# Patient Record
Sex: FEMALE | Race: WHITE
[De-identification: names, ages, dates, MRNs, and addresses within clinical notes are randomized per-mention and may not be internally consistent; named-entity substitution may affect disease eponyms.]

---

## 2017-06-21 PROBLEM — Z00.00 ENCOUNTER FOR PREVENTIVE HEALTH EXAMINATION: Status: ACTIVE | Noted: 2017-06-21

## 2018-07-05 ENCOUNTER — APPOINTMENT (OUTPATIENT)
Dept: ORTHOPEDIC SURGERY | Facility: CLINIC | Age: 78
End: 2018-07-05
Payer: MEDICARE

## 2018-07-05 VITALS — WEIGHT: 175 LBS | BODY MASS INDEX: 27.47 KG/M2 | HEIGHT: 67 IN

## 2018-07-05 DIAGNOSIS — Z78.9 OTHER SPECIFIED HEALTH STATUS: ICD-10-CM

## 2018-07-05 DIAGNOSIS — M18.11 UNILATERAL PRIMARY OSTEOARTHRITIS OF FIRST CARPOMETACARPAL JOINT, RIGHT HAND: ICD-10-CM

## 2018-07-05 DIAGNOSIS — Z87.891 PERSONAL HISTORY OF NICOTINE DEPENDENCE: ICD-10-CM

## 2018-07-05 DIAGNOSIS — Z86.39 PERSONAL HISTORY OF OTHER ENDOCRINE, NUTRITIONAL AND METABOLIC DISEASE: ICD-10-CM

## 2018-07-05 DIAGNOSIS — M19.041 PRIMARY OSTEOARTHRITIS, RIGHT HAND: ICD-10-CM

## 2018-07-05 PROCEDURE — 73110 X-RAY EXAM OF WRIST: CPT | Mod: RT

## 2018-07-05 PROCEDURE — 99203 OFFICE O/P NEW LOW 30 MIN: CPT

## 2018-07-05 RX ORDER — SIMVASTATIN 20 MG/1
20 TABLET, FILM COATED ORAL
Qty: 90 | Refills: 0 | Status: ACTIVE | COMMUNITY
Start: 2018-06-18

## 2018-07-05 RX ORDER — ASPIRIN 325 MG/1
TABLET, FILM COATED ORAL
Refills: 0 | Status: ACTIVE | COMMUNITY

## 2018-07-05 RX ORDER — SOLIFENACIN SUCCINATE 5 MG/1
5 TABLET, FILM COATED ORAL
Qty: 90 | Refills: 0 | Status: ACTIVE | COMMUNITY
Start: 2018-03-22

## 2018-07-05 RX ORDER — ESTRADIOL 0.5 MG/1
0.5 TABLET ORAL
Qty: 45 | Refills: 0 | Status: ACTIVE | COMMUNITY
Start: 2018-07-01

## 2018-07-05 RX ORDER — OSELTAMIVIR PHOSPHATE 75 MG/1
75 CAPSULE ORAL
Qty: 10 | Refills: 0 | Status: ACTIVE | COMMUNITY
Start: 2018-03-01

## 2020-06-17 DIAGNOSIS — Z01.818 ENCOUNTER FOR OTHER PREPROCEDURAL EXAMINATION: ICD-10-CM

## 2020-06-20 ENCOUNTER — APPOINTMENT (OUTPATIENT)
Dept: DISASTER EMERGENCY | Facility: CLINIC | Age: 80
End: 2020-06-20

## 2020-06-22 LAB — SARS-COV-2 N GENE NPH QL NAA+PROBE: NOT DETECTED

## 2022-06-10 ENCOUNTER — APPOINTMENT (OUTPATIENT)
Dept: ORTHOPEDIC SURGERY | Facility: CLINIC | Age: 82
End: 2022-06-10
Payer: MEDICARE

## 2022-06-10 PROCEDURE — 99213 OFFICE O/P EST LOW 20 MIN: CPT | Mod: 25

## 2022-06-10 PROCEDURE — 20610 DRAIN/INJ JOINT/BURSA W/O US: CPT | Mod: 50

## 2022-06-10 PROCEDURE — J3490M: CUSTOM

## 2022-06-10 NOTE — PHYSICAL EXAM
[Normal Mood and Affect] : normal mood and affect [Orientated] : orientated [Able to Communicate] : able to communicate [Well Developed] : well developed [Well Nourished] : well nourished [Right] : right knee [Left] : left knee [NL (0)] : extension 0 degrees [5___] : hamstring 5[unfilled]/5 [Negative] : negative anterior draw [] : non-antalgic [TWNoteComboBox7] : flexion 135 degrees

## 2022-06-10 NOTE — PROCEDURE
[Large Joint Injection] : Large joint injection [Bilateral] : bilaterally of the [Knee] : knee [Pain] : pain [Inflammation] : inflammation [X-ray evidence of Osteoarthritis on this or prior visit] : x-ray evidence of Osteoarthritis on this or prior visit [Betadine] : betadine [Ethyl Chloride sprayed topically] : ethyl chloride sprayed topically [___ cc    1%] : Lidocaine ~Vcc of 1%  [___ cc    40mg] : Methylprednisolone (Depomedrol) ~Vcc of 40 mg  [] : Patient tolerated procedure well [Call if redness, pain or fever occur] : call if redness, pain or fever occur [Apply ice for 15min out of every hour for the next 12-24 hours as tolerated] : apply ice for 15 minutes out of every hour for the next 12-24 hours as tolerated [Risks, benefits, alternatives discussed / Verbal consent obtained] : the risks benefits, and alternatives have been discussed, and verbal consent was obtained

## 2022-06-10 NOTE — HISTORY OF PRESENT ILLNESS
[Left Leg] : left leg [Right Leg] : right leg [Gradual] : gradual [Sudden] : sudden [6] : 6 [5] : 5 [Burning] : burning [Shooting] : shooting [Stabbing] : stabbing [Intermittent] : intermittent [Exercising] : exercising [de-identified] : 06/10/22:  Patient is here today with complaint of recurrent bilateral knee pain 5+ months after cortisone injections to both knees. Had no relief after Euflexxa injections. Would like to repeat the cortisone injections.\par \par 1/4/22 Returns today doing better after attending PT 2x/week. ROM rt knee has improved.\par Finished Euflexxa injections x 4 weeks ago. Leaving for [place] and would like to do cortisone injections both knees.\par \par 12/6/21 Here for Euflexxa injections #3 both knees.\par \par 11/29/21: Returns today for Euflexxa injections #2 both knees. No improvement as of yet. She says she has some pain behind her right knee. \par \par 11/22/21: Returns today with continued right greater than left pain. States her left knee hurts more because of compensating for the right side. Limps. Had been in [place] and was unable to go to PT there. Cortisone injection in September gave only short term relief. \par \par 09/24/21: Returns today with complaint of recurrent bilateral knee pain x last 2-3 weeks, three months after bilateral cortisone injections. Worse walking and bending rt knee.\par \par 06/25/21: Returns today for recurrent left knee pain and known osteoarthritis and MMT and LMT. Previous cortisone injection in November of last year. Also complaining of rt knee pain after injuring rt knee getting out of a moving car x 7 weeks ago. has had daily pain since. Stair climbing is painful. Aleve two tabs as needed with some relief. No prior hx of rt knee pain.\par \par 11/17/20: Patient returns today after a Doppler study for her left veins and was told of a popliteal cyst and to followup with an ortho. No DVT. She has some posterior pain with bending. No real left knee pain today, low pain scale.\par \par 6/25/20 return visit for this 81 yo female walking 5 miles/day, c/o recurring lt knee pain x last 3 weeks duration. No limp. has pain at rest as well. Occ. wake up pain at night. Takes aleve prn. tried Feldene in the past w/o relief.\par \par 12/27/19: Returns today with left knee recurrent pain. Had cortisone injection in September which helped until this past week. Leaving to [place] for the winter and is requesting another cortisone injection for the left knee\par \par 9/3/19 returns today c/o recurring medial joint line pain lt knee.Was going to PT which helped but and to stop x 4 weeks ago after Mohs surgery on lt shin. Will restart PT tomorrow.\par \par 07/22/19: Returns today for left knee MRI results. Has been feeling 90% better after cortisone injection along the pes tendons.Still has her arthritic type pain. In PT 2x/week which helps.\par \par 7/8/19: Returns today for L knee pain. Last cortisone injection x 6 weeks ago did not help much. Feels the pain is different now - pain is still medial and now anterior and can radiate.Occ. limping. Never had an MRI.\par \par 6/6/19 Returns today feeling 50% better after cortisone injection x 9 days ago. Still c/o pain behind lt knee.Occ. limp. About 2 days ago, had an acute flareup w/ pain along the medial side of the knee.Today feeling better again.\par 05/28/19: New injury today. While getting to the passenger side of a car that was a tight area about a month ago she developed left knee pain. Taken Aleve for pain, which helped only a little. Using a cane.\par \par Has had previous left knee pain treated with cortisone injection. [] : Post Surgical Visit: no [FreeTextEntry1] : B/L knees  [de-identified] : xrays  [de-identified] : None  [de-identified] : 1/4/22

## 2022-09-26 ENCOUNTER — APPOINTMENT (OUTPATIENT)
Dept: ORTHOPEDIC SURGERY | Facility: CLINIC | Age: 82
End: 2022-09-26

## 2022-09-26 PROCEDURE — 20610 DRAIN/INJ JOINT/BURSA W/O US: CPT | Mod: RT

## 2022-09-26 PROCEDURE — 99213 OFFICE O/P EST LOW 20 MIN: CPT | Mod: 25

## 2022-09-26 NOTE — HISTORY OF PRESENT ILLNESS
[Left Leg] : left leg [Right Leg] : right leg [Gradual] : gradual [Sudden] : sudden [6] : 6 [5] : 5 [Burning] : burning [Shooting] : shooting [Stabbing] : stabbing [Intermittent] : intermittent [Exercising] : exercising [de-identified] : 9/26/22: Patient returns today with recurrent bilateral knee pain. She did have good relief with CSI at the last visit, but symptoms have returned. Occasional Aleve with relief. She is requesting repeat CSI, leaving for [place].\par \par 06/10/22:  Patient is here today with complaint of recurrent bilateral knee pain 5+ months after cortisone injections to both knees. Had no relief after Euflexxa injections. Would like to repeat the cortisone injections.\par \par 1/4/22 Returns today doing better after attending PT 2x/week. ROM rt knee has improved.\par Finished Euflexxa injections x 4 weeks ago. Leaving for [place] and would like to do cortisone injections both knees.\par \par 12/6/21 Here for Euflexxa injections #3 both knees.\par \par 11/29/21: Returns today for Euflexxa injections #2 both knees. No improvement as of yet. She says she has some pain behind her right knee. \par \par 11/22/21: Returns today with continued right greater than left pain. States her left knee hurts more because of compensating for the right side. Limps. Had been in [place] and was unable to go to PT there. Cortisone injection in September gave only short term relief. \par \par 09/24/21: Returns today with complaint of recurrent bilateral knee pain x last 2-3 weeks, three months after bilateral cortisone injections. Worse walking and bending rt knee.\par \par 06/25/21: Returns today for recurrent left knee pain and known osteoarthritis and MMT and LMT. Previous cortisone injection in November of last year. Also complaining of rt knee pain after injuring rt knee getting out of a moving car x 7 weeks ago. has had daily pain since. Stair climbing is painful. Aleve two tabs as needed with some relief. No prior hx of rt knee pain.\par \par 11/17/20: Patient returns today after a Doppler study for her left veins and was told of a popliteal cyst and to followup with an ortho. No DVT. She has some posterior pain with bending. No real left knee pain today, low pain scale.\par \par 6/25/20 return visit for this 81 yo female walking 5 miles/day, c/o recurring lt knee pain x last 3 weeks duration. No limp. has pain at rest as well. Occ. wake up pain at night. Takes aleve prn. tried Feldene in the past w/o relief.\par \par 12/27/19: Returns today with left knee recurrent pain. Had cortisone injection in September which helped until this past week. Leaving to [place] for the winter and is requesting another cortisone injection for the left knee\par \par 9/3/19 returns today c/o recurring medial joint line pain lt knee.Was going to PT which helped but and to stop x 4 weeks ago after Mohs surgery on lt shin. Will restart PT tomorrow.\par \par 07/22/19: Returns today for left knee MRI results. Has been feeling 90% better after cortisone injection along the pes tendons.Still has her arthritic type pain. In PT 2x/week which helps.\par \par 7/8/19: Returns today for L knee pain. Last cortisone injection x 6 weeks ago did not help much. Feels the pain is different now - pain is still medial and now anterior and can radiate.Occ. limping. Never had an MRI.\par \par 6/6/19 Returns today feeling 50% better after cortisone injection x 9 days ago. Still c/o pain behind lt knee.Occ. limp. About 2 days ago, had an acute flareup w/ pain along the medial side of the knee.Today feeling better again.\par 05/28/19: New injury today. While getting to the passenger side of a car that was a tight area about a month ago she developed left knee pain. Taken Aleve for pain, which helped only a little. Using a cane.\par \par Has had previous left knee pain treated with cortisone injection. [] : Post Surgical Visit: no [FreeTextEntry1] : B/L knees  [de-identified] : xrays  [de-identified] : None  [de-identified] : 1/4/22

## 2023-01-03 ENCOUNTER — APPOINTMENT (OUTPATIENT)
Dept: ORTHOPEDIC SURGERY | Facility: CLINIC | Age: 83
End: 2023-01-03
Payer: MEDICARE

## 2023-01-03 VITALS — HEIGHT: 67 IN | BODY MASS INDEX: 27.47 KG/M2 | WEIGHT: 175 LBS

## 2023-01-03 PROCEDURE — 20610 DRAIN/INJ JOINT/BURSA W/O US: CPT | Mod: 50

## 2023-01-03 PROCEDURE — 99213 OFFICE O/P EST LOW 20 MIN: CPT | Mod: 25

## 2023-01-03 NOTE — HISTORY OF PRESENT ILLNESS
[0] : 0 [Dull/Aching] : dull/aching [Sharp] : sharp [Intermittent] : intermittent [Retired] : Work status: retired [de-identified] : 01/03/23:  Patient returns today with complaint of recurrent bilateral knee pain 3+ months after injections.Worse stairclimbing. Leaving for [place] and would like to repeat the injections.\par \par 9/26/22: Patient returns today with recurrent bilateral knee pain. She did have good relief with CSI at the last visit, but symptoms have returned. Occasional Aleve with relief. She is requesting repeat CSI, leaving for [place].\par \par 06/10/22:  Patient is here today with complaint of recurrent bilateral knee pain 5+ months after cortisone injections to both knees. Had no relief after Euflexxa injections. Would like to repeat the cortisone injections.\par \par 1/4/22 Returns today doing better after attending PT 2x/week. ROM rt knee has improved.\par Finished Euflexxa injections x 4 weeks ago. Leaving for [place] and would like to do cortisone injections both knees.\par \par 12/6/21 Here for Euflexxa injections #3 both knees.\par \par 11/29/21: Returns today for Euflexxa injections #2 both knees. No improvement as of yet. She says she has some pain behind her right knee. \par \par 11/22/21: Returns today with continued right greater than left pain. States her left knee hurts more because of compensating for the right side. Limps. Had been in [place] and was unable to go to PT there. Cortisone injection in September gave only short term relief. \par \par 09/24/21: Returns today with complaint of recurrent bilateral knee pain x last 2-3 weeks, three months after bilateral cortisone injections. Worse walking and bending rt knee.\par \par 06/25/21: Returns today for recurrent left knee pain and known osteoarthritis and MMT and LMT. Previous cortisone injection in November of last year. Also complaining of rt knee pain after injuring rt knee getting out of a moving car x 7 weeks ago. has had daily pain since. Stair climbing is painful. Aleve two tabs as needed with some relief. No prior hx of rt knee pain.\par \par 11/17/20: Patient returns today after a Doppler study for her left veins and was told of a popliteal cyst and to followup with an ortho. No DVT. She has some posterior pain with bending. No real left knee pain today, low pain scale.\par \par 6/25/20 return visit for this 79 yo female walking 5 miles/day, c/o recurring lt knee pain x last 3 weeks duration. No limp. has pain at rest as well. Occ. wake up pain at night. Takes aleve prn. tried Feldene in the past w/o relief.\par \par 12/27/19: Returns today with left knee recurrent pain. Had cortisone injection in September which helped until this past week. Leaving to [place] for the winter and is requesting another cortisone injection for the left knee\par \par 9/3/19 returns today c/o recurring medial joint line pain lt knee.Was going to PT which helped but and to stop x 4 weeks ago after Mohs surgery on lt shin. Will restart PT tomorrow.\par \par 07/22/19: Returns today for left knee MRI results. Has been feeling 90% better after cortisone injection along the pes tendons.Still has her arthritic type pain. In PT 2x/week which helps.\par \par 7/8/19: Returns today for L knee pain. Last cortisone injection x 6 weeks ago did not help much. Feels the pain is different now - pain is still medial and now anterior and can radiate.Occ. limping. Never had an MRI.\par \par 6/6/19 Returns today feeling 50% better after cortisone injection x 9 days ago. Still c/o pain behind lt knee.Occ. limp. About 2 days ago, had an acute flareup w/ pain along the medial side of the knee.Today feeling better again.\par 05/28/19: New injury today. While getting to the passenger side of a car that was a tight area about a month ago she developed left knee pain. Taken Aleve for pain, which helped only a little. Using a cane.\par \par Has had previous left knee pain treated with cortisone injection. [FreeTextEntry1] : bilateral knee

## 2023-05-15 ENCOUNTER — APPOINTMENT (OUTPATIENT)
Dept: ORTHOPEDIC SURGERY | Facility: CLINIC | Age: 83
End: 2023-05-15
Payer: MEDICARE

## 2023-05-15 VITALS — WEIGHT: 175 LBS | HEIGHT: 67 IN | BODY MASS INDEX: 27.47 KG/M2

## 2023-05-15 PROCEDURE — 72040 X-RAY EXAM NECK SPINE 2-3 VW: CPT

## 2023-05-15 PROCEDURE — 99214 OFFICE O/P EST MOD 30 MIN: CPT | Mod: 25

## 2023-05-15 PROCEDURE — 20610 DRAIN/INJ JOINT/BURSA W/O US: CPT | Mod: 50

## 2023-05-15 NOTE — REASON FOR VISIT
[FreeTextEntry2] : Neck dull pain and stiffness  past 3 months  with pain level 3 active and 0 rest./ Continued bilateral knee pain .

## 2023-05-15 NOTE — HISTORY OF PRESENT ILLNESS
[Rest] : rest [Bending forward] : bending forward [Retired] : Work status: retired [de-identified] : 05/15/23:  Patient returns today for painless crunching sound when she turns her neck that she noticed a few months ago, she does not remember exactly when.  Has some stiffness.  Takes ibuprofen for pain.  He had to take a prednisone pack recently for a cough and said it helped her pain. \par \par Also complaining of recurrent bilateral knee pain.  Previous injections four months ago.\par \par 01/03/23:  Patient returns today with complaint of recurrent bilateral knee pain 3+ months after injections.Worse stairclimbing. Leaving for [place] and would like to repeat the injections.\par \par 9/26/22: Patient returns today with recurrent bilateral knee pain. She did have good relief with CSI at the last visit, but symptoms have returned. Occasional Aleve with relief. She is requesting repeat CSI, leaving for [place].\par \par 06/10/22:  Patient is here today with complaint of recurrent bilateral knee pain 5+ months after cortisone injections to both knees. Had no relief after Euflexxa injections. Would like to repeat the cortisone injections.\par \par 1/4/22 Returns today doing better after attending PT 2x/week. ROM rt knee has improved.\par Finished Euflexxa injections x 4 weeks ago. Leaving for [place] and would like to do cortisone injections both knees.\par \par 12/6/21 Here for Euflexxa injections #3 both knees.\par \par 11/29/21: Returns today for Euflexxa injections #2 both knees. No improvement as of yet. She says she has some pain behind her right knee. \par \par 11/22/21: Returns today with continued right greater than left pain. States her left knee hurts more because of compensating for the right side. Limps. Had been in [place] and was unable to go to PT there. Cortisone injection in September gave only short term relief. \par \par 09/24/21: Returns today with complaint of recurrent bilateral knee pain x last 2-3 weeks, three months after bilateral cortisone injections. Worse walking and bending rt knee.\par \par 06/25/21: Returns today for recurrent left knee pain and known osteoarthritis and MMT and LMT. Previous cortisone injection in November of last year. Also complaining of rt knee pain after injuring rt knee getting out of a moving car x 7 weeks ago. has had daily pain since. Stair climbing is painful. Aleve two tabs as needed with some relief. No prior hx of rt knee pain.\par \par 11/17/20: Patient returns today after a Doppler study for her left veins and was told of a popliteal cyst and to followup with an ortho. No DVT. She has some posterior pain with bending. No real left knee pain today, low pain scale.\par \par 6/25/20 return visit for this 81 yo female walking 5 miles/day, c/o recurring lt knee pain x last 3 weeks duration. No limp. has pain at rest as well. Occ. wake up pain at night. Takes aleve prn. tried Feldene in the past w/o relief.\par \par 12/27/19: Returns today with left knee recurrent pain. Had cortisone injection in September which helped until this past week. Leaving to [place] for the winter and is requesting another cortisone injection for the left knee\par \par 9/3/19 returns today c/o recurring medial joint line pain lt knee.Was going to PT which helped but and to stop x 4 weeks ago after Mohs surgery on lt shin. Will restart PT tomorrow.\par \par 07/22/19: Returns today for left knee MRI results. Has been feeling 90% better after cortisone injection along the pes tendons.Still has her arthritic type pain. In PT 2x/week which helps.\par \par 7/8/19: Returns today for L knee pain. Last cortisone injection x 6 weeks ago did not help much. Feels the pain is different now - pain is still medial and now anterior and can radiate.Occ. limping. Never had an MRI.\par \par 6/6/19 Returns today feeling 50% better after cortisone injection x 9 days ago. Still c/o pain behind lt knee.Occ. limp. About 2 days ago, had an acute flareup w/ pain along the medial side of the knee.Today feeling better again.\par 05/28/19: New injury today. While getting to the passenger side of a car that was a tight area about a month ago she developed left knee pain. Taken Aleve for pain, which helped only a little. Using a cane.\par \par Has had previous left knee pain treated with cortisone injection. [] : no [FreeTextEntry1] : neck and bilateral knees [de-identified] : turning

## 2023-05-15 NOTE — PHYSICAL EXAM
[Normal Mood and Affect] : normal mood and affect [Orientated] : orientated [Able to Communicate] : able to communicate [Well Developed] : well developed [Well Nourished] : well nourished [Right] : right knee [Left] : left knee [NL (0)] : extension 0 degrees [5___] : hamstring 5[unfilled]/5 [Negative] : negative anterior draw [NL (45)] : extension 45 degrees [Rotation to left] : rotation to left [Rotation to right] : rotation to right [Facet arthropathy] : Facet arthropathy [Disc space narrowing] : Disc space narrowing [FreeTextEntry9] : 15 right lateral flexion. 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  DFD C3-C6. [de-identified] : left lateral flexion 10 degrees [de-identified] : left lateral rotation 45 degrees [TWNoteComboBox6] : right lateral rotation 45 degrees [] : non-antalgic [TWNoteComboBox7] : flexion 135 degrees

## 2023-05-15 NOTE — DISCUSSION/SUMMARY
[de-identified] : "Written by Donya Angel, acting as Scribe for Garret Kebede MD."\par \par Dr. Kebede - \par The documentation recorded by the scribe accurately reflects the service I personally performed and the decisions made by me.

## 2023-05-19 ENCOUNTER — APPOINTMENT (OUTPATIENT)
Dept: VASCULAR SURGERY | Facility: CLINIC | Age: 83
End: 2023-05-19
Payer: MEDICARE

## 2023-05-19 VITALS
DIASTOLIC BLOOD PRESSURE: 106 MMHG | SYSTOLIC BLOOD PRESSURE: 191 MMHG | HEART RATE: 65 BPM | BODY MASS INDEX: 27.47 KG/M2 | WEIGHT: 175 LBS | HEIGHT: 67 IN | TEMPERATURE: 98.2 F

## 2023-05-19 VITALS — DIASTOLIC BLOOD PRESSURE: 93 MMHG | SYSTOLIC BLOOD PRESSURE: 179 MMHG | HEART RATE: 62 BPM

## 2023-05-19 LAB — DEPRECATED D DIMER PPP IA-ACNC: 273 NG/ML DDU

## 2023-05-19 PROCEDURE — 99204 OFFICE O/P NEW MOD 45 MIN: CPT

## 2023-05-19 PROCEDURE — 93970 EXTREMITY STUDY: CPT

## 2023-05-19 RX ORDER — RIVAROXABAN 15 MG-20MG
15 & 20 KIT ORAL
Qty: 1 | Refills: 2 | Status: ACTIVE | COMMUNITY
Start: 2023-05-19 | End: 1900-01-01

## 2023-05-19 RX ORDER — APIXABAN 5 MG (74)
5 KIT ORAL
Qty: 200 | Refills: 1 | Status: ACTIVE | COMMUNITY
Start: 2023-05-19 | End: 1900-01-01

## 2023-05-22 ENCOUNTER — OFFICE (OUTPATIENT)
Dept: URBAN - METROPOLITAN AREA CLINIC 109 | Facility: CLINIC | Age: 83
Setting detail: OPHTHALMOLOGY
End: 2023-05-22
Payer: MEDICARE

## 2023-05-22 DIAGNOSIS — G43.109: ICD-10-CM

## 2023-05-22 DIAGNOSIS — H40.1131: ICD-10-CM

## 2023-05-22 DIAGNOSIS — H04.123: ICD-10-CM

## 2023-05-22 DIAGNOSIS — H43.393: ICD-10-CM

## 2023-05-22 DIAGNOSIS — D31.31: ICD-10-CM

## 2023-05-22 DIAGNOSIS — H35.54: ICD-10-CM

## 2023-05-22 DIAGNOSIS — H26.493: ICD-10-CM

## 2023-05-22 PROCEDURE — 92250 FUNDUS PHOTOGRAPHY W/I&R: CPT | Performed by: OPHTHALMOLOGY

## 2023-05-22 PROCEDURE — 92083 EXTENDED VISUAL FIELD XM: CPT | Performed by: OPHTHALMOLOGY

## 2023-05-22 PROCEDURE — 92014 COMPRE OPH EXAM EST PT 1/>: CPT | Performed by: OPHTHALMOLOGY

## 2023-05-22 PROCEDURE — 92020 GONIOSCOPY: CPT | Performed by: OPHTHALMOLOGY

## 2023-05-22 ASSESSMENT — REFRACTION_CURRENTRX
OS_AXIS: 115
OD_OVR_VA: 20/
OS_CYLINDER: -0.25
OD_SPHERE: +2.00
OS_SPHERE: +0.25
OS_OVR_VA: 20/
OD_ADD: +2.75
OS_ADD: +2.75

## 2023-05-22 ASSESSMENT — KERATOMETRY
OS_K2POWER_DIOPTERS: 42.25
OD_K2POWER_DIOPTERS: 42.12
OD_AXISANGLE_DEGREES: 69
OS_AXISANGLE_DEGREES: 95
OS_K1POWER_DIOPTERS: 41.62
OD_K1POWER_DIOPTERS: 41.12

## 2023-05-22 ASSESSMENT — REFRACTION_MANIFEST
OS_VA1: 20/25
OS_SPHERE: +0.50

## 2023-05-22 ASSESSMENT — REFRACTION_AUTOREFRACTION
OD_CYLINDER: -0.50
OS_SPHERE: +0.50
OD_AXIS: 180
OD_SPHERE: +0.25

## 2023-05-22 ASSESSMENT — DECREASING TEAR LAKE - SEVERITY SCORE
OD_DEC_TEARLAKE: 2+
OS_DEC_TEARLAKE: 1+

## 2023-05-22 ASSESSMENT — CONFRONTATIONAL VISUAL FIELD TEST (CVF)
OD_FINDINGS: FULL
OS_FINDINGS: FULL

## 2023-05-22 ASSESSMENT — VISUAL ACUITY
OD_BCVA: 20/30
OS_BCVA: 20/30

## 2023-05-22 ASSESSMENT — TONOMETRY
OD_IOP_MMHG: 16
OS_IOP_MMHG: 16

## 2023-05-22 ASSESSMENT — AXIALLENGTH_DERIVED: OD_AL: 24.3

## 2023-05-22 ASSESSMENT — SPHEQUIV_DERIVED: OD_SPHEQUIV: 0

## 2023-06-16 RX ORDER — APIXABAN 5 MG/1
5 TABLET, FILM COATED ORAL
Qty: 60 | Refills: 2 | Status: ACTIVE | COMMUNITY
Start: 2023-06-16 | End: 1900-01-01

## 2023-06-30 ENCOUNTER — APPOINTMENT (OUTPATIENT)
Dept: VASCULAR SURGERY | Facility: CLINIC | Age: 83
End: 2023-06-30
Payer: MEDICARE

## 2023-06-30 VITALS — HEART RATE: 60 BPM | DIASTOLIC BLOOD PRESSURE: 93 MMHG | SYSTOLIC BLOOD PRESSURE: 174 MMHG

## 2023-06-30 VITALS
DIASTOLIC BLOOD PRESSURE: 77 MMHG | HEIGHT: 68 IN | BODY MASS INDEX: 26.52 KG/M2 | HEART RATE: 59 BPM | WEIGHT: 175 LBS | SYSTOLIC BLOOD PRESSURE: 180 MMHG | TEMPERATURE: 97.9 F

## 2023-06-30 PROCEDURE — 93971 EXTREMITY STUDY: CPT | Mod: LT

## 2023-06-30 PROCEDURE — 99212 OFFICE O/P EST SF 10 MIN: CPT

## 2023-09-14 ENCOUNTER — APPOINTMENT (OUTPATIENT)
Dept: ORTHOPEDIC SURGERY | Facility: CLINIC | Age: 83
End: 2023-09-14
Payer: MEDICARE

## 2023-09-14 VITALS — WEIGHT: 175 LBS | BODY MASS INDEX: 26.52 KG/M2 | HEIGHT: 68 IN

## 2023-09-14 DIAGNOSIS — I80.202 PHLEBITIS AND THROMBOPHLEBITIS OF UNSPECIFIED DEEP VESSELS OF LEFT LOWER EXTREMITY: ICD-10-CM

## 2023-09-14 DIAGNOSIS — M17.11 UNILATERAL PRIMARY OSTEOARTHRITIS, RIGHT KNEE: ICD-10-CM

## 2023-09-14 DIAGNOSIS — M47.812 SPONDYLOSIS W/OUT MYELOPATHY OR RADICULOPATHY, CERVICAL REGION: ICD-10-CM

## 2023-09-14 DIAGNOSIS — I10 ESSENTIAL (PRIMARY) HYPERTENSION: ICD-10-CM

## 2023-09-14 PROCEDURE — 20610 DRAIN/INJ JOINT/BURSA W/O US: CPT | Mod: 50

## 2023-09-14 PROCEDURE — 99213 OFFICE O/P EST LOW 20 MIN: CPT | Mod: 25

## 2023-11-27 ENCOUNTER — OFFICE (OUTPATIENT)
Dept: URBAN - METROPOLITAN AREA CLINIC 109 | Facility: CLINIC | Age: 83
Setting detail: OPHTHALMOLOGY
End: 2023-11-27
Payer: MEDICARE

## 2023-11-27 DIAGNOSIS — H40.1131: ICD-10-CM

## 2023-11-27 PROCEDURE — 92014 COMPRE OPH EXAM EST PT 1/>: CPT | Performed by: OPHTHALMOLOGY

## 2023-11-27 PROCEDURE — 92133 CPTRZD OPH DX IMG PST SGM ON: CPT | Performed by: OPHTHALMOLOGY

## 2023-11-27 PROCEDURE — 92083 EXTENDED VISUAL FIELD XM: CPT | Performed by: OPHTHALMOLOGY

## 2023-11-27 ASSESSMENT — DECREASING TEAR LAKE - SEVERITY SCORE
OD_DEC_TEARLAKE: 2+
OS_DEC_TEARLAKE: 1+

## 2023-11-27 ASSESSMENT — REFRACTION_CURRENTRX
OD_SPHERE: +2.00
OD_ADD: +2.75
OS_ADD: +2.75
OS_CYLINDER: -0.25
OS_AXIS: 115
OS_OVR_VA: 20/
OD_OVR_VA: 20/
OS_SPHERE: +0.25

## 2023-11-27 ASSESSMENT — REFRACTION_AUTOREFRACTION
OS_SPHERE: +0.50
OD_AXIS: 180
OD_SPHERE: +0.25
OD_CYLINDER: -0.50

## 2023-11-27 ASSESSMENT — SPHEQUIV_DERIVED: OD_SPHEQUIV: 0

## 2023-11-27 ASSESSMENT — CONFRONTATIONAL VISUAL FIELD TEST (CVF)
OS_FINDINGS: FULL
OD_FINDINGS: FULL

## 2023-11-27 ASSESSMENT — REFRACTION_MANIFEST
OS_SPHERE: +0.50
OS_VA1: 20/25

## 2023-11-30 ENCOUNTER — APPOINTMENT (OUTPATIENT)
Dept: ORTHOPEDIC SURGERY | Facility: CLINIC | Age: 83
End: 2023-11-30
Payer: MEDICARE

## 2023-11-30 VITALS — WEIGHT: 175 LBS | BODY MASS INDEX: 27.15 KG/M2 | HEIGHT: 67.5 IN

## 2023-11-30 DIAGNOSIS — M50.322 OTHER CERVICAL DISC DEGENERATION AT C5-C6 LEVEL: ICD-10-CM

## 2023-11-30 DIAGNOSIS — M50.321 OTHER CERVICAL DISC DEGENERATION AT C4-C5 LEVEL: ICD-10-CM

## 2023-11-30 DIAGNOSIS — M54.2 CERVICALGIA: ICD-10-CM

## 2023-11-30 PROCEDURE — 99213 OFFICE O/P EST LOW 20 MIN: CPT

## 2023-11-30 RX ORDER — SOLIFENACIN SUCCINATE 10 MG/1
TABLET, FILM COATED ORAL
Refills: 0 | Status: ACTIVE | COMMUNITY

## 2023-11-30 RX ORDER — METHYLPREDNISOLONE 4 MG/1
4 TABLET ORAL
Qty: 1 | Refills: 1 | Status: ACTIVE | COMMUNITY
Start: 2023-11-30 | End: 1900-01-01

## 2023-12-15 ENCOUNTER — APPOINTMENT (OUTPATIENT)
Dept: VASCULAR SURGERY | Facility: CLINIC | Age: 83
End: 2023-12-15

## 2024-01-05 ENCOUNTER — APPOINTMENT (OUTPATIENT)
Dept: ORTHOPEDIC SURGERY | Facility: CLINIC | Age: 84
End: 2024-01-05
Payer: MEDICARE

## 2024-01-05 VITALS — WEIGHT: 175 LBS | BODY MASS INDEX: 27.15 KG/M2 | HEIGHT: 67.5 IN

## 2024-01-05 DIAGNOSIS — S83.282D OTHER TEAR OF LATERAL MENISCUS, CURRENT INJURY, LEFT KNEE, SUBSEQUENT ENCOUNTER: ICD-10-CM

## 2024-01-05 DIAGNOSIS — S83.242D OTHER TEAR OF MEDIAL MENISCUS, CURRENT INJURY, LEFT KNEE, SUBSEQUENT ENCOUNTER: ICD-10-CM

## 2024-01-05 PROCEDURE — 20610 DRAIN/INJ JOINT/BURSA W/O US: CPT | Mod: 50

## 2024-01-05 PROCEDURE — 99213 OFFICE O/P EST LOW 20 MIN: CPT | Mod: 25

## 2024-01-05 NOTE — PLAN
[TextEntry] : The natural progression of osteoarthritis was explained to the patient.  We discussed the possible treatment options from conservative to operative.  These included NSAIDs, Glucosamine and Chondroitin sulfate, and physical therapy as well different types of injections.  We also discussed that at some point they may progress to need a TKA.  Information and pamphlets were given.   Cortisone injection given today, both knees.  Medication was injected into the above treated area. After verbal consent using sterile preparation and technique. The risks, benefits, and alternatives to cortisone injection were explained in full to the patient. Risks outlined include but are not limited to infection, sepsis, bleeding, scarring, skin discoloration, temporary increase in pain, syncopal episode, failure to resolve symptoms, allergic reaction, symptom recurrence, and elevation of blood sugar in diabetics. Patient understood the risks. All questions were answered. After discussion of options, patient requested an injection. Oral informed consent was obtained and sterile prep was done of the injection site. Sterile technique was utilized for the procedure including the preparation of the solutions used for the injection. Patient tolerated the procedure well. Advised to ice the injection site this evening. Prep with Betadine locally to site. Sterile technique used. Patient tolerated procedure well. Post Procedure Instructions: Patient was advised to call if redness, pain, or fever occur and apply ice for 15 min. out of every hour for the next 12-24 hours as tolerated.   Patient may weightbear as tolerated.  The patient was instructed on the importance of ice and elevation of the extremity to decrease swelling and pain.

## 2024-01-05 NOTE — HISTORY OF PRESENT ILLNESS
[5] : 5 [0] : 0 [Dull/Aching] : dull/aching [Sharp] : sharp [Intermittent] : intermittent [Retired] : Work status: retired [de-identified] : 01/05/23:  Returns today complaining of recurrent bilateral knee pain about four months after cortisone injections. Leaving for [place] and would like to repeat the injections.  09/14/23:  Patient returns today complaining of recurrent bilateral knee pain.  Previous cortisone injections to both knees about four months ago which helped until now.  Currently in PT for her neck and knees which helps. PMH: hx of recent DVT lt knee 5/23. Treated with Eliquis x 2 months  05/15/23:  Patient returns today for painless crunching sound when she turns her neck that she noticed a few months ago, she does not remember exactly when.  Has some stiffness.  Takes ibuprofen for pain.  He had to take a prednisone pack recently for a cough and said it helped her pain.   Also complaining of recurrent bilateral knee pain.  Previous injections four months ago.  01/03/23:  Patient returns today with complaint of recurrent bilateral knee pain 3+ months after injections.Worse stairclimbing. Leaving for [place] and would like to repeat the injections.  9/26/22: Patient returns today with recurrent bilateral knee pain. She did have good relief with CSI at the last visit, but symptoms have returned. Occasional Aleve with relief. She is requesting repeat CSI, leaving for [place].  06/10/22:  Patient is here today with complaint of recurrent bilateral knee pain 5+ months after cortisone injections to both knees. Had no relief after Euflexxa injections. Would like to repeat the cortisone injections.  1/4/22 Returns today doing better after attending PT 2x/week. ROM rt knee has improved. Finished Euflexxa injections x 4 weeks ago. Leaving for [place] and would like to do cortisone injections both knees.  12/6/21 Here for Euflexxa injections #3 both knees.  11/29/21: Returns today for Euflexxa injections #2 both knees. No improvement as of yet. She says she has some pain behind her right knee.   11/22/21: Returns today with continued right greater than left pain. States her left knee hurts more because of compensating for the right side. Limps. Had been in [place] and was unable to go to PT there. Cortisone injection in September gave only short term relief.   09/24/21: Returns today with complaint of recurrent bilateral knee pain x last 2-3 weeks, three months after bilateral cortisone injections. Worse walking and bending rt knee.  06/25/21: Returns today for recurrent left knee pain and known osteoarthritis and MMT and LMT. Previous cortisone injection in November of last year. Also complaining of rt knee pain after injuring rt knee getting out of a moving car x 7 weeks ago. has had daily pain since. Stair climbing is painful. Aleve two tabs as needed with some relief. No prior hx of rt knee pain.  11/17/20: Patient returns today after a Doppler study for her left veins and was told of a popliteal cyst and to followup with an ortho. No DVT. She has some posterior pain with bending. No real left knee pain today, low pain scale.  6/25/20 return visit for this 81 yo female walking 5 miles/day, c/o recurring lt knee pain x last 3 weeks duration. No limp. has pain at rest as well. Occ. wake up pain at night. Takes aleve prn. tried Feldene in the past w/o relief.  12/27/19: Returns today with left knee recurrent pain. Had cortisone injection in September which helped until this past week. Leaving to [place] for the winter and is requesting another cortisone injection for the left knee  9/3/19 returns today c/o recurring medial joint line pain lt knee.Was going to PT which helped but and to stop x 4 weeks ago after Mohs surgery on lt shin. Will restart PT tomorrow.  07/22/19: Returns today for left knee MRI results. Has been feeling 90% better after cortisone injection along the pes tendons.Still has her arthritic type pain. In PT 2x/week which helps.  7/8/19: Returns today for L knee pain. Last cortisone injection x 6 weeks ago did not help much. Feels the pain is different now - pain is still medial and now anterior and can radiate.Occ. limping. Never had an MRI.  6/6/19 Returns today feeling 50% better after cortisone injection x 9 days ago. Still c/o pain behind lt knee.Occ. limp. About 2 days ago, had an acute flareup w/ pain along the medial side of the knee.Today feeling better again. 05/28/19: New injury today. While getting to the passenger side of a car that was a tight area about a month ago she developed left knee pain. Taken Aleve for pain, which helped only a little. Using a cane.  Has had previous left knee pain treated with cortisone injection. [] : no [FreeTextEntry1] : bilateral knees  [de-identified] : Pt thru sept

## 2024-01-05 NOTE — PHYSICAL EXAM
[Right] : right knee [Left] : left knee [NL (0)] : extension 0 degrees [5___] : hamstring 5[unfilled]/5 [Negative] : negative anterior draw [Normal Mood and Affect] : normal mood and affect [Able to Communicate] : able to communicate [Well Developed] : well developed [Well Nourished] : well nourished [] : non-antalgic [TWNoteComboBox7] : flexion 130 degrees

## 2024-06-12 ENCOUNTER — OFFICE (OUTPATIENT)
Dept: URBAN - METROPOLITAN AREA CLINIC 109 | Facility: CLINIC | Age: 84
Setting detail: OPHTHALMOLOGY
End: 2024-06-12
Payer: MEDICARE

## 2024-06-12 DIAGNOSIS — H04.123: ICD-10-CM

## 2024-06-12 DIAGNOSIS — D31.31: ICD-10-CM

## 2024-06-12 DIAGNOSIS — H26.493: ICD-10-CM

## 2024-06-12 DIAGNOSIS — H35.54: ICD-10-CM

## 2024-06-12 DIAGNOSIS — H43.393: ICD-10-CM

## 2024-06-12 DIAGNOSIS — H40.1131: ICD-10-CM

## 2024-06-12 PROCEDURE — 92083 EXTENDED VISUAL FIELD XM: CPT | Performed by: OPHTHALMOLOGY

## 2024-06-12 PROCEDURE — 92014 COMPRE OPH EXAM EST PT 1/>: CPT | Performed by: OPHTHALMOLOGY

## 2024-06-12 PROCEDURE — 92133 CPTRZD OPH DX IMG PST SGM ON: CPT | Performed by: OPHTHALMOLOGY

## 2024-06-12 ASSESSMENT — CONFRONTATIONAL VISUAL FIELD TEST (CVF)
OS_FINDINGS: FULL
OD_FINDINGS: FULL

## 2024-06-27 ENCOUNTER — APPOINTMENT (OUTPATIENT)
Dept: ORTHOPEDIC SURGERY | Facility: CLINIC | Age: 84
End: 2024-06-27
Payer: MEDICARE

## 2024-06-27 VITALS — BODY MASS INDEX: 27.47 KG/M2 | HEIGHT: 67 IN | WEIGHT: 175 LBS

## 2024-06-27 DIAGNOSIS — I89.0 LYMPHEDEMA, NOT ELSEWHERE CLASSIFIED: ICD-10-CM

## 2024-06-27 DIAGNOSIS — M17.11 UNILATERAL PRIMARY OSTEOARTHRITIS, RIGHT KNEE: ICD-10-CM

## 2024-06-27 DIAGNOSIS — M17.12 UNILATERAL PRIMARY OSTEOARTHRITIS, LEFT KNEE: ICD-10-CM

## 2024-06-27 DIAGNOSIS — M20.22 HALLUX RIGIDUS, LEFT FOOT: ICD-10-CM

## 2024-06-27 PROCEDURE — 73630 X-RAY EXAM OF FOOT: CPT | Mod: LT

## 2024-06-27 PROCEDURE — 99213 OFFICE O/P EST LOW 20 MIN: CPT

## 2024-06-27 PROCEDURE — 73562 X-RAY EXAM OF KNEE 3: CPT | Mod: 50

## 2024-07-05 ENCOUNTER — APPOINTMENT (OUTPATIENT)
Dept: VASCULAR SURGERY | Facility: CLINIC | Age: 84
End: 2024-07-05
Payer: MEDICARE

## 2024-07-05 VITALS — SYSTOLIC BLOOD PRESSURE: 129 MMHG | HEART RATE: 65 BPM | DIASTOLIC BLOOD PRESSURE: 64 MMHG

## 2024-07-05 VITALS
BODY MASS INDEX: 27.47 KG/M2 | TEMPERATURE: 97.9 F | HEIGHT: 67 IN | WEIGHT: 175 LBS | HEART RATE: 72 BPM | SYSTOLIC BLOOD PRESSURE: 151 MMHG | DIASTOLIC BLOOD PRESSURE: 70 MMHG

## 2024-07-05 PROCEDURE — 93970 EXTREMITY STUDY: CPT

## 2024-07-05 PROCEDURE — 99213 OFFICE O/P EST LOW 20 MIN: CPT

## 2024-07-29 ENCOUNTER — APPOINTMENT (OUTPATIENT)
Dept: PAIN MANAGEMENT | Facility: CLINIC | Age: 84
End: 2024-07-29
Payer: MEDICARE

## 2024-07-29 VITALS — HEIGHT: 67 IN | WEIGHT: 175 LBS | BODY MASS INDEX: 27.47 KG/M2

## 2024-07-29 DIAGNOSIS — M54.2 CERVICALGIA: ICD-10-CM

## 2024-07-29 DIAGNOSIS — M47.812 SPONDYLOSIS W/OUT MYELOPATHY OR RADICULOPATHY, CERVICAL REGION: ICD-10-CM

## 2024-07-29 PROCEDURE — 99204 OFFICE O/P NEW MOD 45 MIN: CPT

## 2024-07-29 NOTE — DISCUSSION/SUMMARY
[de-identified] : After discussing various treatment options with the patient including but not limited to oral medications, physical therapy, exercise modalities as well as interventional spinal injections, we have decided with the following plan:  - Continue home exercises, stretching, activity modification, physical therapy, and conservative care. - CT scan report and/or images was reviewed and discussed with the patient. - Follow-up as needed.

## 2024-07-29 NOTE — PHYSICAL EXAM
[de-identified] : Constitutional; Appears well, no apparent distress Ability to communicate: Normal  Respiratory: non-labored breathing Skin: No rash noted Head: Normocephalic, atraumatic Neck: no visible thyroid enlargement Eyes: Extraocular movements intact Neurologic: Alert and oriented x3 Psychiatric: normal mood, affect and behavior [] : positive facet loading

## 2024-07-29 NOTE — PROCEDURE
[FreeTextEntry3] : Procedure Name: Trigger Point Injection (1-2 muscle groups): Celestone and Marcaine Trigger Point was performed because of pain.  Celestone: An injection of Celestone 6 mg Marcaine: An injection of Marcaine 10 mg Medication was injected in the Right Cervical Paraspinal muscle.   Patient has tried OTC's including aspirin, Ibuprofen, Aleve etc or prescription NSAIDS, and/or exercises at home and/ or physical therapy without satisfactory response. After verbal consent using sterile preparation and technique.The risks, benefits, and alternatives to cortisone injection were explained in full to the patient. Risks outlined include but are not limited to infection, sepsis, bleeding, scarring, skin discoloration, temporary increase in pain, syncopal episode, failure to resolve symptoms, allergic reaction, symptom recurrence, and elevation of blood sugar in diabetics. Patient understood the risks. All questions were answered. After discussion of options, patient requested an injection. Oral informed consent was obtained and sterile prep was done of the injection site. Sterile technique was utilized for the procedure including the preparation of the solutions used for the injection. Patient tolerated the procedure well. Advised to ice the injection site this evening. Prep with alcohol locally to site. Sterile technique used.

## 2024-07-29 NOTE — HISTORY OF PRESENT ILLNESS
[Neck] : neck [10] : 10 [9] : 9 [Dull/Aching] : dull/aching [Shooting] : shooting [Intermittent] : intermittent [Household chores] : household chores [Nothing helps with pain getting better] : Nothing helps with pain getting better [Standing] : standing [Lying in bed] : lying in bed [FreeTextEntry1] : Initial HPI 07/29/2024: Pain started Nov 2023 and is on the right side of the neck and radiates up the neck and up the head to the eyebrow with associated headaches. Had Right C2,3,4  MBBs and RFA which helped.    to the right shoulder and down the right arm to the fingers described as a sharp shooting pain with associated numbness and tingling.   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

## 2024-08-02 ENCOUNTER — APPOINTMENT (OUTPATIENT)
Dept: VASCULAR SURGERY | Facility: CLINIC | Age: 84
End: 2024-08-02
Payer: MEDICARE

## 2024-08-02 VITALS
HEIGHT: 67 IN | WEIGHT: 175 LBS | BODY MASS INDEX: 27.47 KG/M2 | HEART RATE: 66 BPM | DIASTOLIC BLOOD PRESSURE: 81 MMHG | SYSTOLIC BLOOD PRESSURE: 137 MMHG

## 2024-08-02 DIAGNOSIS — I87.2 VENOUS INSUFFICIENCY (CHRONIC) (PERIPHERAL): ICD-10-CM

## 2024-08-02 DIAGNOSIS — I89.0 LYMPHEDEMA, NOT ELSEWHERE CLASSIFIED: ICD-10-CM

## 2024-08-02 PROCEDURE — 99213 OFFICE O/P EST LOW 20 MIN: CPT

## 2024-08-02 NOTE — HISTORY OF PRESENT ILLNESS
[FreeTextEntry1] : Pt is here to evaluate bilateral lower extremities. History of venous insufficiency and left popliteal DVT which was treated with Eliquis. Also history of right GSV stripping. Reports swelling is worse on the left leg than the right. Associated heaviness and discomfort. Better in the morning and worse with prolonged sitting, standing, walking and at the end of the day. Partially relieved with leg elevation and compression stockings. She is compliant with compression stockings and leg elevation. Denies claudication, rest pain or wounds. Patient has not started lymphedema therapy. She is currently getting PT for her neck

## 2024-08-02 NOTE — ASSESSMENT
[Arterial/Venous Disease] : arterial/venous disease [Other: _____] : [unfilled] [FreeTextEntry1] : Impression - venous insufficiency, lymphedema affecting the left leg more than the right   Plan Conservative medical management - leg elevation, exercise regimen, calf muscle exercises, knee high 30-40 mm hg compression stockings, SCD pump Will refer pt for SCD pump Advised pt to consider lymphedema therapy after PT for neck is completed Pt has follow up appointment made with Dr. Hong in December 2024 to evaluate bilateral legs with venous doppler to r/o dvt Advised pt to keep follow up appointment   right thigh 21.5 inches right calf 15 inches left thigh 22.5 inches left calf 16.5 inches

## 2024-08-02 NOTE — PHYSICAL EXAM
[2+] : left 2+ [Ankle Swelling (On Exam)] : present [Ankle Swelling Bilaterally] : bilaterally  [Ankle Swelling On The Left] : moderate [] : bilaterally [Ankle Swelling On The Right] : mild [No Rash or Lesion] : No rash or lesion [Alert] : alert [Oriented to Person] : oriented to person [Oriented to Place] : oriented to place [Oriented to Time] : oriented to time [Calm] : calm [Varicose Veins Of Lower Extremities] : not present [de-identified] : NAD [de-identified] : NCAT [de-identified] : unlabored breathing [FreeTextEntry1] : bilateral lower extremities soft, warm and nontender moderate LLE edema, mild RLE edema venous stasis changes with dry skin and hyperpigmentation intact skin no wounds

## 2024-08-19 ENCOUNTER — APPOINTMENT (OUTPATIENT)
Dept: ORTHOPEDIC SURGERY | Facility: CLINIC | Age: 84
End: 2024-08-19
Payer: MEDICARE

## 2024-08-19 VITALS — WEIGHT: 175 LBS | HEIGHT: 67 IN | BODY MASS INDEX: 27.47 KG/M2

## 2024-08-19 DIAGNOSIS — M17.12 UNILATERAL PRIMARY OSTEOARTHRITIS, LEFT KNEE: ICD-10-CM

## 2024-08-19 DIAGNOSIS — M17.11 UNILATERAL PRIMARY OSTEOARTHRITIS, RIGHT KNEE: ICD-10-CM

## 2024-08-19 PROCEDURE — 99213 OFFICE O/P EST LOW 20 MIN: CPT | Mod: 25

## 2024-08-19 PROCEDURE — 20610 DRAIN/INJ JOINT/BURSA W/O US: CPT | Mod: 50

## 2024-08-19 NOTE — PHYSICAL EXAM
[Normal Mood and Affect] : normal mood and affect [Able to Communicate] : able to communicate [Well Developed] : well developed [Well Nourished] : well nourished [NL (0)] : extension 0 degrees [5___] : hamstring 5[unfilled]/5 [Negative] : negative anterior draw [Right] : right knee [Moderate tricompartmental OA lateral narrowing] : Moderate tricompartmental OA lateral narrowing [Left] : left knee [AP] : anteroposterior [Lateral] : lateral [[place]] : skyline [advanced tricompartmental OA with medial compartment narrowing and varus alignment] : advanced tricompartmental OA with medial compartment narrowing and varus alignment [Moderate patellofemoral OA] : Moderate patellofemoral OA [] : non-antalgic [TWNoteComboBox7] : flexion 130 degrees

## 2024-08-19 NOTE — HISTORY OF PRESENT ILLNESS
[Sharp] : sharp [Intermittent] : intermittent [Retired] : Work status: retired [7] : 7 [1] : 2 [Dull/Aching] : dull/aching [de-identified] : 08/19/24:  Returns today c/o increasing bilateral knee pain. Last cortisone injections x 4 months ago while in [place]. Would like to repeat the injections.  06/27/24:  Returns today c/o recurring swelling  and pain lt knee. Had a cortisone injection both knees in 4/24 while in [place] w/o relief.Chief complaint is swelling both legs, lt > rt    Also c/o some pain and swelling over dorsum lt foot x last few months duration. No hx of trauma.  01/05/23:  Returns today complaining of recurrent bilateral knee pain about four months after cortisone injections. Leaving for [place] and would like to repeat the injections.  09/14/23:  Patient returns today complaining of recurrent bilateral knee pain.  Previous cortisone injections to both knees about four months ago which helped until now.  Currently in PT for her neck and knees which helps. PMH: hx of recent DVT lt knee 5/23. Treated with Eliquis x 2 months  05/15/23:  Patient returns today for painless crunching sound when she turns her neck that she noticed a few months ago, she does not remember exactly when.  Has some stiffness.  Takes ibuprofen for pain.  He had to take a prednisone pack recently for a cough and said it helped her pain.   Also complaining of recurrent bilateral knee pain.  Previous injections four months ago.  01/03/23:  Patient returns today with complaint of recurrent bilateral knee pain 3+ months after injections.Worse stairclimbing. Leaving for [place] and would like to repeat the injections.  9/26/22: Patient returns today with recurrent bilateral knee pain. She did have good relief with CSI at the last visit, but symptoms have returned. Occasional Aleve with relief. She is requesting repeat CSI, leaving for [place].  06/10/22:  Patient is here today with complaint of recurrent bilateral knee pain 5+ months after cortisone injections to both knees. Had no relief after Euflexxa injections. Would like to repeat the cortisone injections.  1/4/22 Returns today doing better after attending PT 2x/week. ROM rt knee has improved. Finished Euflexxa injections x 4 weeks ago. Leaving for [place] and would like to do cortisone injections both knees.  12/6/21 Here for Euflexxa injections #3 both knees.  11/29/21: Returns today for Euflexxa injections #2 both knees. No improvement as of yet. She says she has some pain behind her right knee.   11/22/21: Returns today with continued right greater than left pain. States her left knee hurts more because of compensating for the right side. Limps. Had been in [place] and was unable to go to PT there. Cortisone injection in September gave only short term relief.   09/24/21: Returns today with complaint of recurrent bilateral knee pain x last 2-3 weeks, three months after bilateral cortisone injections. Worse walking and bending rt knee.  06/25/21: Returns today for recurrent left knee pain and known osteoarthritis and MMT and LMT. Previous cortisone injection in November of last year. Also complaining of rt knee pain after injuring rt knee getting out of a moving car x 7 weeks ago. has had daily pain since. Stair climbing is painful. Aleve two tabs as needed with some relief. No prior hx of rt knee pain.  11/17/20: Patient returns today after a Doppler study for her left veins and was told of a popliteal cyst and to followup with an ortho. No DVT. She has some posterior pain with bending. No real left knee pain today, low pain scale.  6/25/20 return visit for this 79 yo female walking 5 miles/day, c/o recurring lt knee pain x last 3 weeks duration. No limp. has pain at rest as well. Occ. wake up pain at night. Takes aleve prn. tried Feldene in the past w/o relief.  12/27/19: Returns today with left knee recurrent pain. Had cortisone injection in September which helped until this past week. Leaving to [place] for the winter and is requesting another cortisone injection for the left knee  9/3/19 returns today c/o recurring medial joint line pain lt knee.Was going to PT which helped but and to stop x 4 weeks ago after Mohs surgery on lt shin. Will restart PT tomorrow.  07/22/19: Returns today for left knee MRI results. Has been feeling 90% better after cortisone injection along the pes tendons.Still has her arthritic type pain. In PT 2x/week which helps.  7/8/19: Returns today for L knee pain. Last cortisone injection x 6 weeks ago did not help much. Feels the pain is different now - pain is still medial and now anterior and can radiate.Occ. limping. Never had an MRI.  6/6/19 Returns today feeling 50% better after cortisone injection x 9 days ago. Still c/o pain behind lt knee.Occ. limp. About 2 days ago, had an acute flareup w/ pain along the medial side of the knee.Today feeling better again. 05/28/19: New injury today. While getting to the passenger side of a car that was a tight area about a month ago she developed left knee pain. Taken Aleve for pain, which helped only a little. Using a cane.  Has had previous left knee pain treated with cortisone injection. [] : no [FreeTextEntry1] : bilateral knees [FreeTextEntry9] : Aleve [de-identified] : 6/27/24 [de-identified] : dr del castillo [de-identified] : none

## 2024-11-19 ENCOUNTER — APPOINTMENT (OUTPATIENT)
Dept: PAIN MANAGEMENT | Facility: CLINIC | Age: 84
End: 2024-11-19

## 2024-11-21 ENCOUNTER — APPOINTMENT (OUTPATIENT)
Dept: ORTHOPEDIC SURGERY | Facility: CLINIC | Age: 84
End: 2024-11-21
Payer: MEDICARE

## 2024-11-21 VITALS — BODY MASS INDEX: 27.47 KG/M2 | WEIGHT: 175 LBS | HEIGHT: 67 IN

## 2024-11-21 DIAGNOSIS — M17.12 UNILATERAL PRIMARY OSTEOARTHRITIS, LEFT KNEE: ICD-10-CM

## 2024-11-21 DIAGNOSIS — S83.282D OTHER TEAR OF LATERAL MENISCUS, CURRENT INJURY, LEFT KNEE, SUBSEQUENT ENCOUNTER: ICD-10-CM

## 2024-11-21 DIAGNOSIS — S83.242D OTHER TEAR OF MEDIAL MENISCUS, CURRENT INJURY, LEFT KNEE, SUBSEQUENT ENCOUNTER: ICD-10-CM

## 2024-11-21 DIAGNOSIS — M17.11 UNILATERAL PRIMARY OSTEOARTHRITIS, RIGHT KNEE: ICD-10-CM

## 2024-11-21 PROCEDURE — 20610 DRAIN/INJ JOINT/BURSA W/O US: CPT | Mod: 50

## 2024-11-21 PROCEDURE — 99213 OFFICE O/P EST LOW 20 MIN: CPT | Mod: 25

## 2024-11-25 ENCOUNTER — APPOINTMENT (OUTPATIENT)
Dept: PAIN MANAGEMENT | Facility: CLINIC | Age: 84
End: 2024-11-25
Payer: MEDICARE

## 2024-11-25 VITALS — BODY MASS INDEX: 28.72 KG/M2 | HEIGHT: 67 IN | WEIGHT: 183 LBS

## 2024-11-25 DIAGNOSIS — M54.2 CERVICALGIA: ICD-10-CM

## 2024-11-25 DIAGNOSIS — M47.812 SPONDYLOSIS W/OUT MYELOPATHY OR RADICULOPATHY, CERVICAL REGION: ICD-10-CM

## 2024-11-25 PROCEDURE — 99214 OFFICE O/P EST MOD 30 MIN: CPT

## 2024-11-25 RX ORDER — DULOXETINE HYDROCHLORIDE 30 MG/1
30 CAPSULE, DELAYED RELEASE PELLETS ORAL
Qty: 30 | Refills: 0 | Status: ACTIVE | COMMUNITY
Start: 2024-11-25 | End: 1900-01-01

## 2024-11-25 RX ORDER — DICLOFENAC SODIUM 75 MG/1
75 TABLET, DELAYED RELEASE ORAL TWICE DAILY
Qty: 60 | Refills: 0 | Status: ACTIVE | COMMUNITY
Start: 2024-11-25 | End: 1900-01-01

## 2024-11-26 ENCOUNTER — OFFICE (OUTPATIENT)
Dept: URBAN - METROPOLITAN AREA CLINIC 109 | Facility: CLINIC | Age: 84
Setting detail: OPHTHALMOLOGY
End: 2024-11-26
Payer: MEDICARE

## 2024-11-26 DIAGNOSIS — H40.1131: ICD-10-CM

## 2024-11-26 DIAGNOSIS — H16.223: ICD-10-CM

## 2024-11-26 PROCEDURE — 68761 CLOSE TEAR DUCT OPENING: CPT | Mod: 50 | Performed by: OPHTHALMOLOGY

## 2024-11-26 PROCEDURE — 99213 OFFICE O/P EST LOW 20 MIN: CPT | Mod: 25 | Performed by: OPHTHALMOLOGY

## 2024-11-26 ASSESSMENT — REFRACTION_AUTOREFRACTION
OS_SPHERE: +0.50
OS_AXIS: 050
OD_CYLINDER: -1.00
OS_CYLINDER: -0.25
OD_AXIS: 173
OD_SPHERE: +0.25

## 2024-11-26 ASSESSMENT — REFRACTION_CURRENTRX
OS_AXIS: 115
OS_CYLINDER: -0.25
OD_ADD: +2.75
OD_SPHERE: +2.00
OS_ADD: +2.75
OS_SPHERE: +0.25
OS_OVR_VA: 20/
OD_OVR_VA: 20/

## 2024-11-26 ASSESSMENT — DECREASING TEAR LAKE - SEVERITY SCORE
OS_DEC_TEARLAKE: 2+
OD_DEC_TEARLAKE: 2+

## 2024-11-26 ASSESSMENT — KERATOMETRY
OS_AXISANGLE_DEGREES: 117
OD_K1POWER_DIOPTERS: 41.75
OD_AXISANGLE_DEGREES: 084
OD_K2POWER_DIOPTERS: 43.00
OS_K1POWER_DIOPTERS: 41.75
OS_K2POWER_DIOPTERS: 42.25

## 2024-11-26 ASSESSMENT — REFRACTION_MANIFEST
OS_SPHERE: +0.50
OS_VA1: 20/25

## 2024-11-26 ASSESSMENT — VISUAL ACUITY
OS_BCVA: 20/40-2
OD_BCVA: 20/25-2

## 2024-11-26 ASSESSMENT — PUNCTA - ASSESSMENT
OS_PUNCTA: 3MON PLUG
OD_PUNCTA: 3MON PLUG

## 2024-11-26 ASSESSMENT — SUPERFICIAL PUNCTATE KERATITIS (SPK)
OD_SPK: 2+
OS_SPK: 2+

## 2024-11-26 ASSESSMENT — LACRIMAL DUCT - ASSESSMENT
OS_LACRIMAL_DUCT: OASYS
OD_LACRIMAL_DUCT: OASYS

## 2024-11-26 ASSESSMENT — CONFRONTATIONAL VISUAL FIELD TEST (CVF)
OD_FINDINGS: FULL
OS_FINDINGS: FULL

## 2024-11-26 ASSESSMENT — TONOMETRY
OS_IOP_MMHG: 17
OD_IOP_MMHG: 17

## 2024-12-02 ENCOUNTER — APPOINTMENT (OUTPATIENT)
Dept: PAIN MANAGEMENT | Facility: CLINIC | Age: 84
End: 2024-12-02

## 2024-12-27 ENCOUNTER — APPOINTMENT (OUTPATIENT)
Dept: VASCULAR SURGERY | Facility: CLINIC | Age: 84
End: 2024-12-27
Payer: MEDICARE

## 2024-12-27 VITALS
HEIGHT: 67 IN | BODY MASS INDEX: 28.72 KG/M2 | WEIGHT: 183 LBS | DIASTOLIC BLOOD PRESSURE: 84 MMHG | HEART RATE: 64 BPM | TEMPERATURE: 98 F | SYSTOLIC BLOOD PRESSURE: 162 MMHG

## 2024-12-27 PROCEDURE — 93970 EXTREMITY STUDY: CPT

## 2024-12-27 PROCEDURE — 99213 OFFICE O/P EST LOW 20 MIN: CPT

## 2025-03-24 ENCOUNTER — APPOINTMENT (OUTPATIENT)
Dept: ORTHOPEDIC SURGERY | Facility: CLINIC | Age: 85
End: 2025-03-24
Payer: MEDICARE

## 2025-03-24 DIAGNOSIS — M17.12 UNILATERAL PRIMARY OSTEOARTHRITIS, LEFT KNEE: ICD-10-CM

## 2025-03-24 DIAGNOSIS — M17.11 UNILATERAL PRIMARY OSTEOARTHRITIS, RIGHT KNEE: ICD-10-CM

## 2025-03-24 PROCEDURE — 99213 OFFICE O/P EST LOW 20 MIN: CPT | Mod: 25

## 2025-03-24 PROCEDURE — 20610 DRAIN/INJ JOINT/BURSA W/O US: CPT | Mod: 50

## 2025-05-02 ENCOUNTER — OFFICE (OUTPATIENT)
Dept: URBAN - METROPOLITAN AREA CLINIC 109 | Facility: CLINIC | Age: 85
Setting detail: OPHTHALMOLOGY
End: 2025-05-02
Payer: MEDICARE

## 2025-05-02 DIAGNOSIS — H16.223: ICD-10-CM

## 2025-05-02 DIAGNOSIS — H40.1131: ICD-10-CM

## 2025-05-02 PROCEDURE — 68761 CLOSE TEAR DUCT OPENING: CPT | Mod: 50 | Performed by: OPHTHALMOLOGY

## 2025-05-02 PROCEDURE — 92133 CPTRZD OPH DX IMG PST SGM ON: CPT | Performed by: OPHTHALMOLOGY

## 2025-05-02 PROCEDURE — 92012 INTRM OPH EXAM EST PATIENT: CPT | Mod: 25 | Performed by: OPHTHALMOLOGY

## 2025-05-02 ASSESSMENT — REFRACTION_AUTOREFRACTION
OD_CYLINDER: -0.75
OD_SPHERE: +0.25
OD_AXIS: 157
OS_SPHERE: +0.25

## 2025-05-02 ASSESSMENT — CONFRONTATIONAL VISUAL FIELD TEST (CVF)
OS_FINDINGS: FULL
OD_FINDINGS: FULL

## 2025-05-02 ASSESSMENT — VISUAL ACUITY
OD_BCVA: 20/30-2
OS_BCVA: 20/30-2

## 2025-05-02 ASSESSMENT — PUNCTA - ASSESSMENT
OS_PUNCTA: 3MON PLUG
OD_PUNCTA: 3MON PLUG

## 2025-05-02 ASSESSMENT — LACRIMAL DUCT - ASSESSMENT
OD_LACRIMAL_DUCT: OASYS .3 LOT # LC1124B
OS_LACRIMAL_DUCT: OASYS .3 LOT # LC1124B

## 2025-05-02 ASSESSMENT — DECREASING TEAR LAKE - SEVERITY SCORE
OD_DEC_TEARLAKE: 2+
OS_DEC_TEARLAKE: 2+

## 2025-05-02 ASSESSMENT — REFRACTION_CURRENTRX
OD_SPHERE: +2.00
OS_AXIS: 115
OS_CYLINDER: -0.25
OD_ADD: +2.75
OS_SPHERE: +0.25
OS_ADD: +2.75
OD_OVR_VA: 20/
OS_OVR_VA: 20/

## 2025-05-02 ASSESSMENT — KERATOMETRY
OD_K2POWER_DIOPTERS: 43.00
OD_K1POWER_DIOPTERS: 41.75
OS_K2POWER_DIOPTERS: 42.25
OD_AXISANGLE_DEGREES: 084
OS_AXISANGLE_DEGREES: 117
OS_K1POWER_DIOPTERS: 41.75

## 2025-05-02 ASSESSMENT — REFRACTION_MANIFEST
OS_SPHERE: +0.50
OS_VA1: 20/25

## 2025-05-02 ASSESSMENT — TONOMETRY
OD_IOP_MMHG: 16
OS_IOP_MMHG: 17

## 2025-05-02 ASSESSMENT — SUPERFICIAL PUNCTATE KERATITIS (SPK)
OD_SPK: 2+
OS_SPK: 2+

## 2025-06-27 ENCOUNTER — APPOINTMENT (OUTPATIENT)
Dept: VASCULAR SURGERY | Facility: CLINIC | Age: 85
End: 2025-06-27
Payer: MEDICARE

## 2025-06-27 PROCEDURE — 99213 OFFICE O/P EST LOW 20 MIN: CPT

## 2025-06-27 PROCEDURE — 93970 EXTREMITY STUDY: CPT

## 2025-07-14 ENCOUNTER — APPOINTMENT (OUTPATIENT)
Dept: ORTHOPEDIC SURGERY | Facility: CLINIC | Age: 85
End: 2025-07-14
Payer: MEDICARE

## 2025-07-14 VITALS — BODY MASS INDEX: 26.21 KG/M2 | WEIGHT: 167 LBS | HEIGHT: 67 IN

## 2025-07-14 PROCEDURE — 20610 DRAIN/INJ JOINT/BURSA W/O US: CPT | Mod: 50

## 2025-07-14 PROCEDURE — 99213 OFFICE O/P EST LOW 20 MIN: CPT | Mod: 25
